# Patient Record
Sex: MALE | Race: WHITE | NOT HISPANIC OR LATINO | Employment: FULL TIME | URBAN - METROPOLITAN AREA
[De-identification: names, ages, dates, MRNs, and addresses within clinical notes are randomized per-mention and may not be internally consistent; named-entity substitution may affect disease eponyms.]

---

## 2023-10-19 PROBLEM — H18.622: Status: ACTIVE | Noted: 2023-10-19

## 2023-10-19 PROBLEM — H18.603 KERATOCONUS OF BOTH EYES: Status: ACTIVE | Noted: 2023-10-19

## 2023-10-19 RX ORDER — NEEDLES, FILTER 18GX1 1/2"
NEEDLE, DISPOSABLE MISCELLANEOUS
COMMUNITY

## 2023-10-19 RX ORDER — TESTOSTERONE CYPIONATE 200 MG/ML
INJECTION, SOLUTION INTRAMUSCULAR
COMMUNITY
Start: 2022-08-12

## 2023-10-19 RX ORDER — TIRZEPATIDE 5 MG/.5ML
INJECTION, SOLUTION SUBCUTANEOUS
COMMUNITY

## 2023-10-20 ENCOUNTER — OFFICE VISIT (OUTPATIENT)
Dept: OPHTHALMOLOGY | Facility: CLINIC | Age: 33
End: 2023-10-20
Payer: COMMERCIAL

## 2023-10-20 DIAGNOSIS — H18.622 KERATOCONUS, UNSTABLE, LEFT EYE: Primary | ICD-10-CM

## 2023-10-20 DIAGNOSIS — H18.603 KERATOCONUS OF BOTH EYES: ICD-10-CM

## 2023-10-20 PROCEDURE — FCCLS CONTACT LENS F/U VISIT: Performed by: STUDENT IN AN ORGANIZED HEALTH CARE EDUCATION/TRAINING PROGRAM

## 2023-10-20 RX ORDER — SYRINGE, DISPOSABLE, 3 ML
1 SYRINGE, EMPTY DISPOSABLE MISCELLANEOUS
COMMUNITY
Start: 2018-02-22

## 2023-10-20 ASSESSMENT — REFRACTION_CURRENTRX
OS_BRAND: SYNERGEYES VS
OD_CYLINDER: -1.25
OS_ADDL_SPECS: 38-44
OS_SPHERE: +1.50
OD_AXIS: 090
OS_BASECURVE: 8.4, 3600
OS_DIAMETER: 16.0
OD_BRAND: PRECISION 1 TORIC
OD_SPHERE: -2.00
OD_BASECURVE: 8.5
OD_DIAMETER: 14.5

## 2023-10-20 ASSESSMENT — CONF VISUAL FIELD
OS_INFERIOR_TEMPORAL_RESTRICTION: 0
OD_SUPERIOR_TEMPORAL_RESTRICTION: 0
OD_INFERIOR_TEMPORAL_RESTRICTION: 0
OS_SUPERIOR_TEMPORAL_RESTRICTION: 0
OD_NORMAL: 1
OS_NORMAL: 1
OS_INFERIOR_NASAL_RESTRICTION: 0
OD_SUPERIOR_NASAL_RESTRICTION: 0
OS_SUPERIOR_NASAL_RESTRICTION: 0
OD_INFERIOR_NASAL_RESTRICTION: 0
METHOD: COUNTING FINGERS

## 2023-10-20 ASSESSMENT — ENCOUNTER SYMPTOMS
CONSTITUTIONAL NEGATIVE: 0
PSYCHIATRIC NEGATIVE: 0
MUSCULOSKELETAL NEGATIVE: 0
GASTROINTESTINAL NEGATIVE: 0
HEMATOLOGIC/LYMPHATIC NEGATIVE: 0
NEUROLOGICAL NEGATIVE: 0
RESPIRATORY NEGATIVE: 0
EYES NEGATIVE: 0
ENDOCRINE NEGATIVE: 0
CARDIOVASCULAR NEGATIVE: 0
ALLERGIC/IMMUNOLOGIC NEGATIVE: 0

## 2023-10-20 ASSESSMENT — VISUAL ACUITY
OD_SC+: -2
METHOD: SNELLEN - LINEAR
OD_CC: 20/20
OS_CC+: -1
OS_CC: 20/30

## 2023-10-20 ASSESSMENT — REFRACTION_MANIFEST
OS_AXIS: 175
OS_SPHERE: -1.00
OS_CYLINDER: -0.50

## 2023-10-20 ASSESSMENT — EXTERNAL EXAM - RIGHT EYE: OD_EXAM: NORMAL

## 2023-10-20 ASSESSMENT — SLIT LAMP EXAM - LIDS
COMMENTS: NORMAL
COMMENTS: NORMAL

## 2023-10-20 ASSESSMENT — EXTERNAL EXAM - LEFT EYE: OS_EXAM: NORMAL

## 2023-10-20 NOTE — PROGRESS NOTES
Assessment/Plan   Problem List Items Addressed This Visit          Eye/Vision problems    Keratoconus, unstable, left eye - Primary     Patient is doing great with Precision 1 Toric right eye (OD) and Synergeyes VS left eye (OS). Some persistent haze which could improve over the course of the next year left eye (OS) status post (s/p) CXL.     RTC with Dr. Walters as scheduled for monitoring  RTC with Mehdi for contact lens (CL) exam 1 year         Keratoconus of both eyes     RTC Mehdi 1 year with DFE, MRX, CL exam

## 2023-10-20 NOTE — ASSESSMENT & PLAN NOTE
Patient is doing great with Precision 1 Toric right eye (OD) and Synergeyes VS left eye (OS). Some persistent haze which could improve over the course of the next year left eye (OS) status post (s/p) CXL.     RTC with Dr. Walters as scheduled for monitoring  RTC with Mehdi for contact lens (CL) exam 1 year

## 2023-11-06 ENCOUNTER — OFFICE VISIT (OUTPATIENT)
Dept: OPHTHALMOLOGY | Facility: CLINIC | Age: 33
End: 2023-11-06
Payer: COMMERCIAL

## 2023-11-06 DIAGNOSIS — H18.603 KERATOCONUS OF BOTH EYES: Primary | ICD-10-CM

## 2023-11-06 DIAGNOSIS — H17.9 CORNEAL SCAR, LEFT EYE: ICD-10-CM

## 2023-11-06 DIAGNOSIS — H18.622 KERATOCONUS, UNSTABLE, LEFT EYE: ICD-10-CM

## 2023-11-06 LAB
KMAX (OD): 49 DIOPTERS
KMAX (OS): 69.9 DIOPTERS
SIMK FLAT (OD): 41.3 DIOPTERS
SIMK FLAT (OS): 47.1 DIOPTERS
SIMK STEEP (OD): 42.6 DIOPTERS
SIMK STEEP (OS): 53 DIOPTERS

## 2023-11-06 PROCEDURE — 92025 CPTRIZED CORNEAL TOPOGRAPHY: CPT | Performed by: OPHTHALMOLOGY

## 2023-11-06 PROCEDURE — 99213 OFFICE O/P EST LOW 20 MIN: CPT | Performed by: OPHTHALMOLOGY

## 2023-11-06 RX ORDER — SULFAMETHOXAZOLE AND TRIMETHOPRIM 800; 160 MG/1; MG/1
TABLET ORAL
COMMUNITY
Start: 2017-09-30

## 2023-11-06 RX ORDER — METRONIDAZOLE 500 MG/1
TABLET ORAL
COMMUNITY
Start: 2016-01-16

## 2023-11-06 RX ORDER — OXYCODONE AND ACETAMINOPHEN 5; 325 MG/1; MG/1
TABLET ORAL
COMMUNITY
Start: 2016-01-16

## 2023-11-06 RX ORDER — PHENTERMINE HYDROCHLORIDE 37.5 MG/1
37.5 TABLET ORAL DAILY
COMMUNITY
Start: 2023-07-07

## 2023-11-06 RX ORDER — HYDROCODONE BITARTRATE AND ACETAMINOPHEN 5; 325 MG/1; MG/1
TABLET ORAL
COMMUNITY
Start: 2017-09-30

## 2023-11-06 RX ORDER — NEEDLES, DISPOSABLE 25GX5/8"
NEEDLE, DISPOSABLE MISCELLANEOUS
COMMUNITY
Start: 2023-10-16

## 2023-11-06 RX ORDER — ERGOCALCIFEROL 1.25 MG/1
1 CAPSULE ORAL
COMMUNITY
Start: 2018-11-30

## 2023-11-06 ASSESSMENT — CONF VISUAL FIELD
OS_NORMAL: 1
OD_NORMAL: 1
OD_SUPERIOR_TEMPORAL_RESTRICTION: 0
OS_SUPERIOR_TEMPORAL_RESTRICTION: 0
OD_INFERIOR_TEMPORAL_RESTRICTION: 0
OS_INFERIOR_NASAL_RESTRICTION: 0
OD_SUPERIOR_NASAL_RESTRICTION: 0
OD_INFERIOR_NASAL_RESTRICTION: 0
OS_SUPERIOR_NASAL_RESTRICTION: 0
OS_INFERIOR_TEMPORAL_RESTRICTION: 0

## 2023-11-06 ASSESSMENT — TONOMETRY
OD_IOP_MMHG: 18
OS_IOP_MMHG: 16
IOP_METHOD: TONOPEN

## 2023-11-06 ASSESSMENT — VISUAL ACUITY
OS_SC: 20/60+1
CORRECTION_TYPE: GLASSES
METHOD: SNELLEN - LINEAR
OD_SC: 20/20-1

## 2023-11-06 ASSESSMENT — EXTERNAL EXAM - LEFT EYE: OS_EXAM: NORMAL

## 2023-11-06 ASSESSMENT — SLIT LAMP EXAM - LIDS
COMMENTS: NORMAL
COMMENTS: NORMAL

## 2023-11-06 ASSESSMENT — EXTERNAL EXAM - RIGHT EYE: OD_EXAM: NORMAL

## 2023-11-06 ASSESSMENT — ENCOUNTER SYMPTOMS: EYES NEGATIVE: 1

## 2023-11-06 NOTE — PROGRESS NOTES
Assessment/Plan   Diagnoses and all orders for this visit:  Keratoconus of both eyes  Keratoconus, unstable, left eye  Status post (s/p) CXL left eye (OS)   Stable fe both eyes (OU)  Monitor  -     Corneal Topography - OU - Both Eyes    Corneal scar, left eye  There is some haze status post (s/p) CXL left eye (OS)  Discussed with pt using losartan drops (gtts) but he is not symptomatic    6 months for pentacam

## 2024-05-06 ENCOUNTER — APPOINTMENT (OUTPATIENT)
Dept: OPHTHALMOLOGY | Facility: CLINIC | Age: 34
End: 2024-05-06
Payer: COMMERCIAL

## 2024-06-03 ENCOUNTER — APPOINTMENT (OUTPATIENT)
Dept: OPHTHALMOLOGY | Facility: CLINIC | Age: 34
End: 2024-06-03
Payer: COMMERCIAL

## 2024-07-15 ENCOUNTER — APPOINTMENT (OUTPATIENT)
Dept: OPHTHALMOLOGY | Facility: CLINIC | Age: 34
End: 2024-07-15
Payer: COMMERCIAL

## 2024-08-12 ENCOUNTER — APPOINTMENT (OUTPATIENT)
Dept: OPHTHALMOLOGY | Facility: CLINIC | Age: 34
End: 2024-08-12
Payer: COMMERCIAL

## 2024-09-09 ENCOUNTER — APPOINTMENT (OUTPATIENT)
Dept: OPHTHALMOLOGY | Facility: CLINIC | Age: 34
End: 2024-09-09
Payer: COMMERCIAL

## 2024-09-09 DIAGNOSIS — H18.603 KERATOCONUS OF BOTH EYES: ICD-10-CM

## 2024-09-09 DIAGNOSIS — H18.622 KERATOCONUS, UNSTABLE, LEFT EYE: Primary | ICD-10-CM

## 2024-09-09 LAB
KMAX (OD): 49.1 DIOPTERS
KMAX (OS): 69.6 DIOPTERS
PACH THINNEST (OD): 564 MICRONS
PACH THINNEST (OS): 475 MICRONS
SIMK FLAT (OD): 41.6 DIOPTERS
SIMK FLAT (OS): 46.2 DIOPTERS
SIMK STEEP (OD): 42.7 DIOPTERS
SIMK STEEP (OS): 53.1 DIOPTERS

## 2024-09-09 PROCEDURE — 92025 CPTRIZED CORNEAL TOPOGRAPHY: CPT | Performed by: OPHTHALMOLOGY

## 2024-09-09 PROCEDURE — 99214 OFFICE O/P EST MOD 30 MIN: CPT | Performed by: OPHTHALMOLOGY

## 2024-09-09 RX ORDER — METFORMIN HYDROCHLORIDE 500 MG/1
500 TABLET, EXTENDED RELEASE ORAL
COMMUNITY
Start: 2024-08-23

## 2024-09-09 ASSESSMENT — SLIT LAMP EXAM - LIDS
COMMENTS: NORMAL
COMMENTS: NORMAL

## 2024-09-09 ASSESSMENT — ENCOUNTER SYMPTOMS
ENDOCRINE NEGATIVE: 0
ALLERGIC/IMMUNOLOGIC NEGATIVE: 0
RESPIRATORY NEGATIVE: 0
MUSCULOSKELETAL NEGATIVE: 0
PSYCHIATRIC NEGATIVE: 0
GASTROINTESTINAL NEGATIVE: 0
CARDIOVASCULAR NEGATIVE: 0
HEMATOLOGIC/LYMPHATIC NEGATIVE: 0
CONSTITUTIONAL NEGATIVE: 0
NEUROLOGICAL NEGATIVE: 0
EYES NEGATIVE: 1

## 2024-09-09 ASSESSMENT — VISUAL ACUITY
CORRECTION_TYPE: GLASSES
METHOD: SNELLEN - LINEAR
OS_CC: 20/60
OD_CC: 20/20-1

## 2024-09-09 ASSESSMENT — TONOMETRY
OD_IOP_MMHG: 15
OS_IOP_MMHG: 12
IOP_METHOD: GOLDMANN APPLANATION

## 2024-09-09 ASSESSMENT — EXTERNAL EXAM - LEFT EYE: OS_EXAM: NORMAL

## 2024-09-09 ASSESSMENT — EXTERNAL EXAM - RIGHT EYE: OD_EXAM: NORMAL

## 2024-09-09 NOTE — PROGRESS NOTES
Assessment/Plan   Diagnoses and all orders for this visit:  Keratoconus of both eyes  Keratoconus, unstable, left eye  Status post (s/p) CXL left eye (OS)   Stable fe both eyes (OU)  Monitor  -     Corneal Topography - OU - Both Eyes      12 months for pentacam and DFE

## 2025-02-20 ENCOUNTER — APPOINTMENT (OUTPATIENT)
Dept: OPHTHALMOLOGY | Facility: CLINIC | Age: 35
End: 2025-02-20
Payer: COMMERCIAL

## 2025-02-20 DIAGNOSIS — H18.603 KERATOCONUS OF BOTH EYES: Primary | ICD-10-CM

## 2025-02-20 DIAGNOSIS — H17.9 CORNEAL SCAR, LEFT EYE: ICD-10-CM

## 2025-02-20 PROCEDURE — 92014 COMPRE OPH EXAM EST PT 1/>: CPT | Performed by: STUDENT IN AN ORGANIZED HEALTH CARE EDUCATION/TRAINING PROGRAM

## 2025-02-20 PROCEDURE — V2531 CONTACT LENS GAS PERMEABLE: HCPCS | Performed by: STUDENT IN AN ORGANIZED HEALTH CARE EDUCATION/TRAINING PROGRAM

## 2025-02-20 PROCEDURE — 92310 CONTACT LENS FITTING OU: CPT | Performed by: STUDENT IN AN ORGANIZED HEALTH CARE EDUCATION/TRAINING PROGRAM

## 2025-02-20 ASSESSMENT — SLIT LAMP EXAM - LIDS
COMMENTS: NORMAL
COMMENTS: NORMAL

## 2025-02-20 ASSESSMENT — VISUAL ACUITY
OD_CC: 20/25
OD_CC+: -1
OS_CC+: -1
CORRECTION_TYPE: GLASSES
METHOD: SNELLEN - LINEAR
OS_CC: 20/40

## 2025-02-20 ASSESSMENT — REFRACTION_CURRENTRX
OD_SPHERE: -1.50
OD_CYLINDER: -1.25
OS_DIAMETER: 16.0
OD_BASECURVE: 8.6
OS_ADDL_SPECS: 38-44
OD_DIAMETER: 14.5
OS_SPHERE: +1.50
OD_BRAND: INFUSE FOR ASTIGMATISM
OD_AXIS: 110
OS_BASECURVE: 8.4, 3600
OS_BRAND: SYNERGEYES VS C HYDRAPEG

## 2025-02-20 ASSESSMENT — REFRACTION_WEARINGRX
OS_CYLINDER: -3.00
OD_AXIS: 094
OS_SPHERE: -0.75
OD_SPHERE: -2.50
OS_AXIS: 091
OD_CYLINDER: -1.50

## 2025-02-20 ASSESSMENT — CUP TO DISC RATIO
OD_RATIO: .30
OS_RATIO: .30

## 2025-02-20 ASSESSMENT — CONF VISUAL FIELD
OD_INFERIOR_TEMPORAL_RESTRICTION: 0
OD_SUPERIOR_NASAL_RESTRICTION: 0
OD_INFERIOR_NASAL_RESTRICTION: 0
OD_SUPERIOR_TEMPORAL_RESTRICTION: 0
OS_INFERIOR_TEMPORAL_RESTRICTION: 0
OD_NORMAL: 1
OS_SUPERIOR_TEMPORAL_RESTRICTION: 0
OS_SUPERIOR_NASAL_RESTRICTION: 0
METHOD: COUNTING FINGERS
OS_NORMAL: 1
OS_INFERIOR_NASAL_RESTRICTION: 0

## 2025-02-20 ASSESSMENT — ENCOUNTER SYMPTOMS
NEUROLOGICAL NEGATIVE: 0
ALLERGIC/IMMUNOLOGIC NEGATIVE: 0
EYES NEGATIVE: 1
HEMATOLOGIC/LYMPHATIC NEGATIVE: 0
PSYCHIATRIC NEGATIVE: 0
CONSTITUTIONAL NEGATIVE: 0
ENDOCRINE NEGATIVE: 0
GASTROINTESTINAL NEGATIVE: 0
MUSCULOSKELETAL NEGATIVE: 0
CARDIOVASCULAR NEGATIVE: 0
RESPIRATORY NEGATIVE: 0

## 2025-02-20 ASSESSMENT — REFRACTION_MANIFEST
OD_SPHERE: -1.50
OS_SPHERE: -0.50
OD_CYLINDER: -1.75
OS_AXIS: 091
OD_AXIS: 107
OS_CYLINDER: -4.00

## 2025-02-20 ASSESSMENT — TONOMETRY
OD_IOP_MMHG: 13
IOP_METHOD: GOLDMANN APPLANATION
OS_IOP_MMHG: 14

## 2025-02-20 ASSESSMENT — EXTERNAL EXAM - LEFT EYE: OS_EXAM: NORMAL

## 2025-02-20 ASSESSMENT — EXTERNAL EXAM - RIGHT EYE: OD_EXAM: NORMAL

## 2025-02-20 NOTE — PROGRESS NOTES
Assessment/Plan   Diagnoses and all orders for this visit:  Keratoconus of both eyes  Corneal scar, left eye  -updated spec RX with BCVA OD 20/20 OS 20/40  -patient had last exam with Dr. Walters 9/9/24  -dilated fundus exam within normal limits today  -pt reports having dryness with both the soft lens OD and scleral lens OS  -will re-order trials of the Infuse Toric for OD and add hydrapeg to OS  -pt to RTC for CL f/u exam when lenses arrive-[place]

## 2025-02-20 NOTE — Clinical Note
Kvng Shukla, can you order trials of the Infuse for astigmatism lens OD only. Can you also order the Synergeyes VS lens OS listed. Would like patient to RTC for a CL f/u exam when the lenses arrive @ [facility]. The lenses would be medically necessary with KCN. If the soft trials work well I am going to order a supply at the f/u exam. Thanks!

## 2025-03-28 ENCOUNTER — APPOINTMENT (OUTPATIENT)
Dept: OPHTHALMOLOGY | Facility: CLINIC | Age: 35
End: 2025-03-28
Payer: COMMERCIAL

## 2025-03-28 DIAGNOSIS — H18.603 KERATOCONUS OF BOTH EYES: Primary | ICD-10-CM

## 2025-03-28 PROCEDURE — FCCLS CONTACT LENS F/U VISIT: Performed by: STUDENT IN AN ORGANIZED HEALTH CARE EDUCATION/TRAINING PROGRAM

## 2025-03-28 ASSESSMENT — REFRACTION_CURRENTRX
OD_BRAND: INFUSE FOR ASTIGMATISM
OD_BASECURVE: 8.6
OD_BRAND: INFUSE FOR ASTIGMATISM
OD_AXIS: 100
OD_BASECURVE: 8.6
OS_ADDL_SPECS: 38-44
OD_CYLINDER: -1.25
OS_SPHERE: +1.50
OD_SPHERE: -1.50
OD_DIAMETER: 14.5
OD_DIAMETER: 14.5
OS_BRAND: SYNERGEYES VS C HYDRAPEG
OS_BASECURVE: 8.4, 3600
OS_DIAMETER: 16.0
OD_SPHERE: -1.00
OD_AXIS: 110
OD_CYLINDER: -2.25

## 2025-03-28 ASSESSMENT — VISUAL ACUITY
VA_OR_OD_CURRENT_RX: 20/20
OD_CC: 20/20
OS_CC: 20/25
VA_OR_OD_CURRENT_RX: 20/20
VA_OR_OS_CURRENT_RX: 20/20
CORRECTION_TYPE: CONTACTS
METHOD: SNELLEN - LINEAR

## 2025-03-28 ASSESSMENT — REFRACTION_MANIFEST
OD_SPHERE: -1.50
OS_AXIS: 091
OS_CYLINDER: -4.00
OS_SPHERE: -0.50
OD_AXIS: 107
OD_CYLINDER: -1.75

## 2025-03-28 ASSESSMENT — ENCOUNTER SYMPTOMS
RESPIRATORY NEGATIVE: 0
CONSTITUTIONAL NEGATIVE: 0
ENDOCRINE NEGATIVE: 0
EYES NEGATIVE: 0
HEMATOLOGIC/LYMPHATIC NEGATIVE: 0
PSYCHIATRIC NEGATIVE: 0
NEUROLOGICAL NEGATIVE: 0
MUSCULOSKELETAL NEGATIVE: 0
CARDIOVASCULAR NEGATIVE: 0
GASTROINTESTINAL NEGATIVE: 0
ALLERGIC/IMMUNOLOGIC NEGATIVE: 0

## 2025-03-28 ASSESSMENT — EXTERNAL EXAM - LEFT EYE: OS_EXAM: NORMAL

## 2025-03-28 ASSESSMENT — SLIT LAMP EXAM - LIDS
COMMENTS: NORMAL
COMMENTS: NORMAL

## 2025-03-28 ASSESSMENT — EXTERNAL EXAM - RIGHT EYE: OD_EXAM: NORMAL

## 2025-03-28 NOTE — PROGRESS NOTES
Assessment/Plan   Diagnoses and all orders for this visit:  Keratoconus of both eyes  Corneal scar, left eye  -updated spec RX with BCVA OD 20/20 OS 20/40  -patient had last exam with Dr. Walters 9/9/24  -dilated fundus exam within normal limits 2/2025  -pt reports having dryness with both the soft lens OD and scleral lens OS  -ordered trials of the Infuse Toric for OD and add hydrapeg to OS  -today good comfort and fit with Infuse Toric and Synergeyes OS      -vision feels slightly off  -dispensed trials OD for two different prescriptions CL RX#1 was Infuse Toric trials. CL RX #2 was an oasys Trial (but we would finalize as an Infuse)  -patient will message to let us know which lens worked best  -if both soft lenses fail then we will plan to schedule him back for a Synergeyes VS fitting OD   -at that fitting check OS to see if patient still accepts a small OR    RTC 1 year for annual with DFE and MRX, CL exam

## 2025-04-21 ENCOUNTER — DOCUMENTATION (OUTPATIENT)
Dept: OPHTHALMOLOGY | Facility: CLINIC | Age: 35
End: 2025-04-21
Payer: COMMERCIAL

## 2025-04-21 ASSESSMENT — REFRACTION_CURRENTRX
OS_ADDL_SPECS: 38-44
OD_AXIS: 110
OD_BASECURVE: 8.6
OS_BRAND: SYNERGEYES VS C HYDRAPEG
OS_BASECURVE: 8.4, 3600
OD_CYLINDER: -1.25
OD_SPHERE: -1.50
OS_DIAMETER: 16.0
OS_SPHERE: +1.50
OD_DIAMETER: 14.5
OD_BRAND: INFUSE FOR ASTIGMATISM

## 2025-04-21 NOTE — Clinical Note
Good afternoon, Ebenezer messaged to finalize the Infuse Toric CL for his right eye. Can we order him a year supply as medically necessary for KCN. Thanks! I finalized the prescription above.

## 2025-04-21 NOTE — PROGRESS NOTES
Patient messaged-would like to finalize Infuse Toric CL and have year supply ordered-medically necessary due to KCN. Reports next year he would like to switch to a Synergeyes VS lens for both eyes.

## 2025-09-22 ENCOUNTER — APPOINTMENT (OUTPATIENT)
Dept: OPHTHALMOLOGY | Facility: CLINIC | Age: 35
End: 2025-09-22
Payer: COMMERCIAL